# Patient Record
Sex: MALE | Race: WHITE | NOT HISPANIC OR LATINO | URBAN - METROPOLITAN AREA
[De-identification: names, ages, dates, MRNs, and addresses within clinical notes are randomized per-mention and may not be internally consistent; named-entity substitution may affect disease eponyms.]

---

## 2023-07-31 ENCOUNTER — HOSPITAL ENCOUNTER (OUTPATIENT)
Dept: DATA CONVERSION | Facility: HOSPITAL | Age: 23
End: 2023-07-31
Attending: ANESTHESIOLOGY | Admitting: ANESTHESIOLOGY
Payer: COMMERCIAL

## 2023-07-31 DIAGNOSIS — R51.9 HEADACHE, UNSPECIFIED: ICD-10-CM

## 2023-07-31 DIAGNOSIS — G97.1 OTHER REACTION TO SPINAL AND LUMBAR PUNCTURE: ICD-10-CM

## 2023-08-03 LAB
ALANINE AMINOTRANSFERASE (SGPT) (U/L) IN SER/PLAS: 24 U/L (ref 10–52)
ALBUMIN (G/DL) IN SER/PLAS: 4.8 G/DL (ref 3.4–5)
ALKALINE PHOSPHATASE (U/L) IN SER/PLAS: 59 U/L (ref 33–120)
ANION GAP IN SER/PLAS: 15 MMOL/L (ref 10–20)
ASPARTATE AMINOTRANSFERASE (SGOT) (U/L) IN SER/PLAS: 19 U/L (ref 9–39)
BASOPHILS (10*3/UL) IN BLOOD BY AUTOMATED COUNT: 0.08 X10E9/L (ref 0–0.1)
BASOPHILS/100 LEUKOCYTES IN BLOOD BY AUTOMATED COUNT: 0.9 % (ref 0–2)
BILIRUBIN TOTAL (MG/DL) IN SER/PLAS: 0.4 MG/DL (ref 0–1.2)
C REACTIVE PROTEIN (MG/L) IN SER/PLAS: 0.53 MG/DL
CALCIUM (MG/DL) IN SER/PLAS: 9.3 MG/DL (ref 8.6–10.6)
CARBON DIOXIDE, TOTAL (MMOL/L) IN SER/PLAS: 24 MMOL/L (ref 21–32)
CHLORIDE (MMOL/L) IN SER/PLAS: 105 MMOL/L (ref 98–107)
CREATININE (MG/DL) IN SER/PLAS: 0.64 MG/DL (ref 0.5–1.3)
EOSINOPHILS (10*3/UL) IN BLOOD BY AUTOMATED COUNT: 0.22 X10E9/L (ref 0–0.7)
EOSINOPHILS/100 LEUKOCYTES IN BLOOD BY AUTOMATED COUNT: 2.5 % (ref 0–6)
ERYTHROCYTE DISTRIBUTION WIDTH (RATIO) BY AUTOMATED COUNT: 11.9 % (ref 11.5–14.5)
ERYTHROCYTE MEAN CORPUSCULAR HEMOGLOBIN CONCENTRATION (G/DL) BY AUTOMATED: 33.9 G/DL (ref 32–36)
ERYTHROCYTE MEAN CORPUSCULAR VOLUME (FL) BY AUTOMATED COUNT: 95 FL (ref 80–100)
ERYTHROCYTES (10*6/UL) IN BLOOD BY AUTOMATED COUNT: 4.89 X10E12/L (ref 4.5–5.9)
GFR MALE: >90 ML/MIN/1.73M2
GLUCOSE (MG/DL) IN SER/PLAS: 99 MG/DL (ref 74–99)
HEMATOCRIT (%) IN BLOOD BY AUTOMATED COUNT: 46.3 % (ref 41–52)
HEMOGLOBIN (G/DL) IN BLOOD: 15.7 G/DL (ref 13.5–17.5)
IMMATURE GRANULOCYTES/100 LEUKOCYTES IN BLOOD BY AUTOMATED COUNT: 0.3 % (ref 0–0.9)
LEUKOCYTES (10*3/UL) IN BLOOD BY AUTOMATED COUNT: 8.9 X10E9/L (ref 4.4–11.3)
LYMPHOCYTES (10*3/UL) IN BLOOD BY AUTOMATED COUNT: 2.33 X10E9/L (ref 1.2–4.8)
LYMPHOCYTES/100 LEUKOCYTES IN BLOOD BY AUTOMATED COUNT: 26.1 % (ref 13–44)
MONOCYTES (10*3/UL) IN BLOOD BY AUTOMATED COUNT: 0.53 X10E9/L (ref 0.1–1)
MONOCYTES/100 LEUKOCYTES IN BLOOD BY AUTOMATED COUNT: 5.9 % (ref 2–10)
NEUTROPHILS (10*3/UL) IN BLOOD BY AUTOMATED COUNT: 5.73 X10E9/L (ref 1.2–7.7)
NEUTROPHILS/100 LEUKOCYTES IN BLOOD BY AUTOMATED COUNT: 64.3 % (ref 40–80)
NRBC (PER 100 WBCS) BY AUTOMATED COUNT: 0 /100 WBC (ref 0–0)
PLATELETS (10*3/UL) IN BLOOD AUTOMATED COUNT: 254 X10E9/L (ref 150–450)
POTASSIUM (MMOL/L) IN SER/PLAS: 3.9 MMOL/L (ref 3.5–5.3)
PROTEIN TOTAL: 7.4 G/DL (ref 6.4–8.2)
SEDIMENTATION RATE, ERYTHROCYTE: 11 MM/H (ref 0–15)
SODIUM (MMOL/L) IN SER/PLAS: 140 MMOL/L (ref 136–145)
UREA NITROGEN (MG/DL) IN SER/PLAS: 12 MG/DL (ref 6–23)

## 2023-08-04 ENCOUNTER — OFFICE VISIT (OUTPATIENT)
Dept: PRIMARY CARE | Facility: CLINIC | Age: 23
End: 2023-08-04
Payer: COMMERCIAL

## 2023-08-04 VITALS
HEART RATE: 83 BPM | BODY MASS INDEX: 38.92 KG/M2 | SYSTOLIC BLOOD PRESSURE: 120 MMHG | HEIGHT: 73 IN | TEMPERATURE: 98.6 F | WEIGHT: 293.7 LBS | DIASTOLIC BLOOD PRESSURE: 78 MMHG | OXYGEN SATURATION: 96 %

## 2023-08-04 DIAGNOSIS — Z86.19 HISTORY OF NEUROSYPHILIS: Primary | ICD-10-CM

## 2023-08-04 PROCEDURE — 99213 OFFICE O/P EST LOW 20 MIN: CPT | Performed by: STUDENT IN AN ORGANIZED HEALTH CARE EDUCATION/TRAINING PROGRAM

## 2023-08-04 PROCEDURE — 1036F TOBACCO NON-USER: CPT | Performed by: STUDENT IN AN ORGANIZED HEALTH CARE EDUCATION/TRAINING PROGRAM

## 2023-08-04 RX ORDER — EMTRICITABINE AND TENOFOVIR DISOPROXIL FUMARATE 100; 150 MG/1; MG/1
TABLET, FILM COATED ORAL
COMMUNITY
Start: 2023-02-14

## 2023-08-04 ASSESSMENT — PAIN SCALES - GENERAL: PAINLEVEL: 0-NO PAIN

## 2023-08-04 NOTE — PROGRESS NOTES
Subjective   Patient ID: Paulo Kevin is a 23 y.o. male who presents for Establish Care.    HPI     Paulo Kevin is a 23 y.o. 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  Patient was empirically started on acyclovir, penicillin and ceftriaxone and CSF studies were positive for VDRL.  MRI was unremarkable.  Patient was started on 2-week course of penicillin.  Today, patient does not endorse any symptoms.  Other infectious work-up was negative for HCV, hep B, HIV, syphilis.  Patient currently on Truvada.  Patient had postdural puncture headache and was seen by pain management and is status post epidural blood patch.    PAST MEDICAL HISTORY:  -As above    PAST SURGICAL HISTORY:  - Andrew tooth 12/2021    FAMILY HISTORY:  -Non-contributory    SOCIAL HISTORY:  Tobacco: Denies  ETOH: Denies  Drug: Denies  Sexual hx: 1 male sexual partner, currently not sexually active  Occupation: Law student    ALLERGIES:  -No known drug allergies      Review of Systems    ROS:    - CONSTITUTIONAL: Denies weight loss, fever and chills.    - HEENT: Denies changes in vision and hearing.    - RESPIRATORY: Denies SOB and cough.    - CV: Denies palpitations and CP.    - GI: Denies abdominal pain, nausea, vomiting and diarrhea.    - : Denies dysuria and urinary frequency.    - MSK: Denies myalgia and joint pain.    - SKIN: Denies rash and pruritus.    - NEUROLOGICAL: Denies headache and syncope.    - PSYCHIATRIC: Denies recent changes in mood. Denies anxiety and depression.    A 12-point review of systems was completed and is otherwise negative except as noted in the HPI.      Objective   /78 (BP Location: Left arm, Patient Position: Sitting, BP Cuff Size: Adult long)   Pulse 83   Temp 37 °C (98.6 °F)  "(Temporal)   Ht 1.854 m (6' 1\")   Wt 133 kg (293 lb 11.2 oz)   SpO2 96%   BMI 38.75 kg/m²     Physical Exam    PHYSICAL EXAM:    - GENERAL: Alert and oriented x 3. No acute distress. Well-nourished.    - EYES: EOMI. Anicteric.    - HENT: Moist mucous membranes. No scleral icterus. No cervical lymphadenopathy.    - LUNGS: Clear to auscultation bilaterally. No accessory muscle use.    - CARDIOVASCULAR: Regular rate and rhythm. No murmur. No JVD.    - ABDOMEN: Soft, non-tender and non-distended. No palpable masses.    - EXTREMITIES: No edema. Non-tender.    - SKIN: No rashes or lesions. Warm.    - NEUROLOGIC: No focal neurological deficits. CN II-XII grossly intact, but not individually tested.    - PSYCHIATRIC: Cooperative. Appropriate mood and affect.      Assessment/Plan       Paulo Kevin is a 23 y.o. 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  Patient here to establish care with a new physician.  No other acute concerns at this time.    #Hx of neurosyphilis  #Post lumbar puncture headache s/p epidural patch  -Fluorescent treponemal Ab: reactive   -VDRL: reactive, 1:1   -RPR: reactive, 1:32   -Other infectious workup: HCV Ab: neg, HBsAg neg, HBsAb positive (immune), HIV neg, Syph Ab pos   -Continue with penicillin G therapy for 2 weeks; PICC line in place  -Follow-up with infectious disease (Dr. Rainey) as scheduled  -Discussed safe sexual practices.  Continue PreP  -Plan for CSF VDRL testing in 3 months    Patient discussed with attending physician Dr. Trent Jackson MD  Family Medicine, PGY-3    This note was completed using Dragon voice recognition technology and may include unintended errors with respect to translation of words, typographical errors or grammar errors which may not have been identified while finalizing the chart.    "

## 2023-08-15 NOTE — PROGRESS NOTES
I reviewed with the resident the medical history and the resident’s findings on physical examination.  I discussed with the resident the patient’s diagnosis and concur with the treatment plan as documented in the resident note.     Morgan Newman MD

## 2023-09-20 ENCOUNTER — LAB (OUTPATIENT)
Dept: LAB | Facility: LAB | Age: 23
End: 2023-09-20
Payer: COMMERCIAL

## 2023-09-21 LAB
RPR MONITORING: REACTIVE
RPR TITER MONITORING: ABNORMAL

## 2023-10-02 NOTE — OP NOTE
Post Operative Note:     Post-Procedure Diagnosis: Postdural puncture headache   Procedure: 1.   2.   3.   4.   5.   Surgeon: Doc   Resident/Fellow/Other Assistant: MEENU Herrera   Estimated Blood Loss (mL): none   Specimen: no   Findings: None     Operative Report Dictated:  Dictation: not applicable - note contains Operative  Report   Operative Report:    Preoperative diagnosis:  Postdural puncture headache  Postoperative diagnosis:  Postdural puncture headache  Procedure: Epidural blood patch under fluoroscopic guidance  Surgeon: Kourtney Roach  Assistant:  Fellow  Anesthesia: Local  Complications: Apparently none    Clinical note: Tucker Kevin is a patient with a history of of post dural puncture headache.  The patient has failed other conservative measures.  Initially we had planned to do his procedure inpatient  but his primary service discharged him to home prior to doing so.  He presents today for the aforementioned procedure.  Prior puncture site correlated with the L2-3 level.    Procedure note: The patient was met in the preoperative holding area after risks benefits and alternatives to procedure were discussed with the patient, informed consent was obtained. Patient brought back to the procedure room and placed in the prone  position on the fluoroscopy table. Area over the back was exposed, prepped, draped, in the usual sterile fashion.  Skin and subcutaneous tissues to the lumbar intralaminar space was anesthetized using 0.5% lidocaine.  An 18-gauge Tuohy needle was inserted  in the skin and advanced into the interlaminar space at L3-4. A glass syringe was used to achieve the epidural space using the loss  resistance technique. Needle tip position was confirmed in AP and lateral view.  Contrast was injected which showed appropriate epidural spread in both AP and lateral views, no intravascular or intrathecal uptake. Attention was taken to the patient ?s antecubital vein on the left. That area was  exposed prepped and draped in the usual sterile fashion. A 21-gauge butterfly needle  was used to access the pain and 20 mL?s of sterile blood was withdrawn. A total of 20 mL's of sterile blood was injected. Needle removed, bandage applied, patient tolerated the procedure well with no immediate complications.      Attestation:   Note Completion:  Attending Attestation I was present for the entire procedure         Electronic Signatures:  Kourtney Roach)  (Signed 31-Jul-2023 12:32)   Authored: Post Operative Note, Note Completion      Last Updated: 31-Jul-2023 12:32 by Kourtney Roach)

## 2023-11-30 ENCOUNTER — APPOINTMENT (OUTPATIENT)
Dept: OPHTHALMOLOGY | Facility: CLINIC | Age: 23
End: 2023-11-30
Payer: COMMERCIAL

## 2024-02-14 ENCOUNTER — OFFICE VISIT (OUTPATIENT)
Dept: OPHTHALMOLOGY | Facility: CLINIC | Age: 24
End: 2024-02-14
Payer: COMMERCIAL

## 2024-02-14 DIAGNOSIS — H54.61 VISION LOSS, RIGHT EYE: ICD-10-CM

## 2024-02-14 DIAGNOSIS — A52.3 NEUROSYPHILIS (HHS-HCC): Primary | ICD-10-CM

## 2024-02-14 PROCEDURE — 92134 CPTRZ OPH DX IMG PST SGM RTA: CPT | Mod: BILATERAL PROCEDURE | Performed by: OPTOMETRIST

## 2024-02-14 PROCEDURE — 92004 COMPRE OPH EXAM NEW PT 1/>: CPT | Performed by: OPTOMETRIST

## 2024-02-14 PROCEDURE — 92083 EXTENDED VISUAL FIELD XM: CPT | Performed by: OPTOMETRIST

## 2024-02-14 ASSESSMENT — REFRACTION
OD_SPHERE: -5.50
OD_AXIS: 180
OS_SPHERE: -5.50
OD_CYLINDER: -0.75

## 2024-02-14 ASSESSMENT — REFRACTION_MANIFEST
OD_SPHERE: -5.50
OD_AXIS: 003
METHOD_AUTOREFRACTION: 1
OD_CYLINDER: -0.75
OS_SPHERE: -5.50

## 2024-02-14 ASSESSMENT — SLIT LAMP EXAM - LIDS
COMMENTS: NORMAL
COMMENTS: NORMAL

## 2024-02-14 ASSESSMENT — CONF VISUAL FIELD
METHOD: COUNTING FINGERS
OD_SUPERIOR_TEMPORAL_RESTRICTION: 0
OD_INFERIOR_NASAL_RESTRICTION: 0
OS_NORMAL: 1
OS_INFERIOR_TEMPORAL_RESTRICTION: 0
OS_INFERIOR_NASAL_RESTRICTION: 0
OD_NORMAL: 1
OS_SUPERIOR_NASAL_RESTRICTION: 0
OD_INFERIOR_TEMPORAL_RESTRICTION: 0
OD_SUPERIOR_NASAL_RESTRICTION: 0
OS_SUPERIOR_TEMPORAL_RESTRICTION: 0

## 2024-02-14 ASSESSMENT — REFRACTION_CURRENTRX
OD_BRAND: ACUVUE
OS_BRAND: ACUVUE

## 2024-02-14 ASSESSMENT — VISUAL ACUITY
METHOD: SNELLEN - LINEAR
CORRECTION_TYPE: CONTACTS
OD_CC: 20/20
OS_CC: 20/20

## 2024-02-14 ASSESSMENT — TONOMETRY
IOP_METHOD: GOLDMANN APPLANATION
OS_IOP_MMHG: 19
OD_IOP_MMHG: 19

## 2024-02-14 ASSESSMENT — ENCOUNTER SYMPTOMS
EYES NEGATIVE: 1
NEUROLOGICAL NEGATIVE: 1

## 2024-02-14 ASSESSMENT — EXTERNAL EXAM - LEFT EYE: OS_EXAM: NORMAL

## 2024-02-14 ASSESSMENT — EXTERNAL EXAM - RIGHT EYE: OD_EXAM: NORMAL

## 2024-02-14 NOTE — PROGRESS NOTES
Neurosyphilis with initial symptoms of blur OS. Also had tinnitus. This went away with PCN treatment. No other symptoms.  [facility] corrects to 20/20 OD and OS.   A Lorenzo 24-2 threshold visual field test was done.  Results were:  OD: the absence of scotoma, pattern standard deviation 1.53 dB, mean deviation -2.59 dB  OS: the absence of scotoma, pattern standard deviation 1.90 dB, mean deviation -2.23 dB    Optical coherence tomography of the retinal nerve fiber layer (RNFL) revealed:   OD: Normal thickness in all four sectors with an average RNFL thickness of 88 micron.  OS: Normal thickness in all four sectors with an average RNFL thickness of 91 micron.      Optical coherence tomography of the macula revealed:   OD: Normal foveal contour, photoreceptor, retinal pigment epithelium, IS/OS junction, central field 284 micron.  Vitreous hyaloid base visualized.  Findings are normal.  OS:  Normal foveal contour, photoreceptor, retinal pigment epithelium, IS/OS junction, central field 276 micron.  Vitreous hyaloid base visualized.  Findings are normal.     Retinal multimodal imaging including photography was completed, and the findings are described in the examination.     All findings normal. The patient was asked to return to our clinic in one year or sooner if ocular or vision changes occur.

## 2024-02-20 PROCEDURE — 87651 STREP A DNA AMP PROBE: CPT

## 2024-02-21 ENCOUNTER — LAB REQUISITION (OUTPATIENT)
Dept: LAB | Facility: HOSPITAL | Age: 24
End: 2024-02-21
Payer: COMMERCIAL

## 2024-02-21 DIAGNOSIS — J02.9 ACUTE PHARYNGITIS, UNSPECIFIED: ICD-10-CM

## 2024-02-21 LAB — S PYO DNA THROAT QL NAA+PROBE: NOT DETECTED

## 2024-09-09 ENCOUNTER — TELEPHONE (OUTPATIENT)
Dept: OPHTHALMOLOGY | Facility: CLINIC | Age: 24
End: 2024-09-09
Payer: COMMERCIAL

## 2024-09-12 ENCOUNTER — TELEPHONE (OUTPATIENT)
Dept: OPHTHALMOLOGY | Facility: CLINIC | Age: 24
End: 2024-09-12
Payer: COMMERCIAL

## 2024-09-13 ENCOUNTER — TELEPHONE (OUTPATIENT)
Dept: OPHTHALMOLOGY | Facility: CLINIC | Age: 24
End: 2024-09-13
Payer: COMMERCIAL

## 2024-09-13 NOTE — TELEPHONE ENCOUNTER
Called to order contacts.  Per  He needs to come in for a fitting before he can order.  Sent to   to schedule.

## 2025-02-21 ENCOUNTER — APPOINTMENT (OUTPATIENT)
Dept: OPHTHALMOLOGY | Facility: CLINIC | Age: 25
End: 2025-02-21
Payer: COMMERCIAL